# Patient Record
Sex: FEMALE | Race: WHITE | Employment: OTHER | URBAN - METROPOLITAN AREA
[De-identification: names, ages, dates, MRNs, and addresses within clinical notes are randomized per-mention and may not be internally consistent; named-entity substitution may affect disease eponyms.]

---

## 2023-03-10 DIAGNOSIS — M79.672 LEFT FOOT PAIN: Primary | ICD-10-CM

## 2023-03-14 ENCOUNTER — OFFICE VISIT (OUTPATIENT)
Dept: ORTHOPEDIC SURGERY | Age: 68
End: 2023-03-14
Payer: MEDICARE

## 2023-03-14 VITALS — RESPIRATION RATE: 16 BRPM | HEIGHT: 65 IN | OXYGEN SATURATION: 100 % | WEIGHT: 154 LBS | BODY MASS INDEX: 25.66 KG/M2

## 2023-03-14 DIAGNOSIS — M21.861 GASTROCNEMIUS EQUINUS OF RIGHT LOWER EXTREMITY: ICD-10-CM

## 2023-03-14 DIAGNOSIS — M20.21 HALLUX RIGIDUS OF RIGHT FOOT: Primary | ICD-10-CM

## 2023-03-14 DIAGNOSIS — M79.671 RIGHT FOOT PAIN: Primary | ICD-10-CM

## 2023-03-14 PROCEDURE — G8400 PT W/DXA NO RESULTS DOC: HCPCS | Performed by: ORTHOPAEDIC SURGERY

## 2023-03-14 PROCEDURE — G8484 FLU IMMUNIZE NO ADMIN: HCPCS | Performed by: ORTHOPAEDIC SURGERY

## 2023-03-14 PROCEDURE — 1123F ACP DISCUSS/DSCN MKR DOCD: CPT | Performed by: ORTHOPAEDIC SURGERY

## 2023-03-14 PROCEDURE — G8427 DOCREV CUR MEDS BY ELIG CLIN: HCPCS | Performed by: ORTHOPAEDIC SURGERY

## 2023-03-14 PROCEDURE — 1036F TOBACCO NON-USER: CPT | Performed by: ORTHOPAEDIC SURGERY

## 2023-03-14 PROCEDURE — G8417 CALC BMI ABV UP PARAM F/U: HCPCS | Performed by: ORTHOPAEDIC SURGERY

## 2023-03-14 PROCEDURE — 1090F PRES/ABSN URINE INCON ASSESS: CPT | Performed by: ORTHOPAEDIC SURGERY

## 2023-03-14 PROCEDURE — 3017F COLORECTAL CA SCREEN DOC REV: CPT | Performed by: ORTHOPAEDIC SURGERY

## 2023-03-14 PROCEDURE — 99204 OFFICE O/P NEW MOD 45 MIN: CPT | Performed by: ORTHOPAEDIC SURGERY

## 2023-03-14 NOTE — LETTER
March 14, 2023      [address] [id number]      Patient: Franc Guajardo   MR Number: [id number]   YOB: 1955   Date of Visit: 3/14/2023       Dear Maribel Roche: Thank you for referring Franc Guajardo to me for evaluation/treatment. Below are the relevant portions of my assessment and plan of care. She has right foot pain, secondary to hallux rigidus, with underlying gastroc equinus contracture. Notably, she has the past medical history as above. She has a history of DVT (denies any current anticoagulation). We had a discussion today about the likely diagnosis and its natural history, physical exam and imaging findings, as well as various treatment options in detail. Surgically, we discussed a possible right foot first MTP joint fusion with bone grafting and gastroc recession, depending on her clinical course. We discussed the expected postoperative course, including the relevant weightbearing restrictions/immobilization. Prior to her initial office visit, she has tried topical anesthetics and steroids. At today's visit, after discussing surgical nonsurgical treatment options, she wished to proceed with nonsurgical management at this time, and states that she may be interested in surgery in January 2024. She does state that she \"cannot exercise anymore\" due to her right foot pain. At today's visit, she also briefly met with my surgical scheduler, Gerson Prieto, and she will call us if/when she wishes to proceed with something surgical sooner. Orders/referrals were placed as below at today's visit. The patient was referred to prosthetics and orthotics to obtain a full-length rigid orthotic insert with Florian's extension. I provided a prescription for Voltaren (4g TOPL q QID PRN pain). We also discussed the potential right first MTP joint injection, however she has declined this option at this time.       All questions were answered and the above plan was agreed upon. The patient will return to clinic PRN without x-rays. In the future, we may consider a right first MTP joint injection versus surgery as above whenever she wishes to proceed. If you have questions, please do not hesitate to call me. I look forward to following [place] along with you.     Sincerely,        Ritika Laguerre MD

## 2023-03-14 NOTE — PROGRESS NOTES
[address] [place] [address] AND SPORTS MEDICINE  [facility]  [facility] [place] [address]  Dept: [phone]    Ambulatory Orthopedic Consult      CHIEF COMPLAINT:    Chief Complaint   Patient presents with    Foot Pain     Right       HISTORY OF PRESENT ILLNESS:      The patient is a 69 y.o. female who is being seen for evaluation of the above, which began for about 2 years atraumatically  . At today's visit, she is using no brace/assistive device. History is obtained today from:   [x]  the patient     [x]  [facility]     []  one family member/friend    []  multiple family members/friends    []  other:      The patient was previously seen a podiatrist (Jared Ornelas). The patient is referred here today from the orthopedics office in [place]. At today's visit, she localizes her pain to the right first MTP joint. REVIEW OF SYSTEMS:  Musculoskeletal: See HPI for pertinent positives     Past Medical History:    She  has no past medical history on file. Past Surgical History:    She  has no past surgical history on file. Current Medications:   No current outpatient medications on file. Allergies:    Patient has no known allergies. Family History:  family history is not on file. Social History:   Social History     Occupational History    Not on file   Tobacco Use    Smoking status: Former     Types: Cigarettes     Quit date:      Years since quittin.2    Smokeless tobacco: Never   Substance and Sexual Activity    Alcohol use: Not on file    Drug use: Not on file    Sexual activity: Not on file     Retired, previously worked in a [address] [facility]:  Resp 16   Ht 5' 5\" (1.651 m)   Wt 154 lb (69.9 kg)   SpO2 100%   BMI 25.63 kg/m²    Psych: awake, alert  Cardio:  well perfused extremities, no cyanosis  Resp:  normal respiratory effort  Musculoskeletal:    RLE:  Vascular: Limb well perfused, compartments soft/compressible. Skin: No erythema/ulcers. Intact. Neurovascular Status:  Grossly neurovascularly intact throughout   Tenderness to Palpation: First MTP joint  -Gastroc equinus--significant  --1st MTP:  Grind Test: Positive; Pain with Max Dorsiflexion: Positive      LLE:  Vascular: Limb well perfused, compartments soft/compressible. Skin: No erythema/ulcers. Intact. Neurovascular Status:  Grossly neurovascularly intact throughout   Tenderness to Palpation:       RADIOLOGY:   3/14/2023 FINDINGS:  Three weightbearing views (AP, Mortise, and Lateral) of the right ankle and three weightbearing views (AP, Oblique, Lateral) of the right foot were obtained in the office today and reviewed, revealing no acute fracture, dislocation, or radioopaque foreign body/tumor. Degenerative changes of the first MTP joint with joint space narrowing, sclerosis, and osteophytes. IMPRESSION:  No acute fracture/dislocation. Degenerative changes as above. Electronically signed by Celestina Boyd MD    Relevant previous imaging reviewed, both imaging and report(s) as below:    No results found. ASSESSMENT AND PLAN:  Body mass index is 25.63 kg/m². She has right foot pain, secondary to hallux rigidus, with underlying gastroc equinus contracture. Notably, she has the past medical history as above. She has a history of DVT (denies any current anticoagulation). We had a discussion today about the likely diagnosis and its natural history, physical exam and imaging findings, as well as various treatment options in detail. Surgically, we discussed a possible right foot first MTP joint fusion with bone grafting and gastroc recession, depending on her clinical course. We discussed the expected postoperative course, including the relevant weightbearing restrictions/immobilization. Prior to her initial office visit, she has tried topical anesthetics and steroids.   At today's visit, after discussing surgical nonsurgical treatment options, she wished to proceed with nonsurgical management at this time, and states that she may be interested in surgery in January 2024. She does state that she \"cannot exercise anymore\" due to her right foot pain. At today's visit, she also briefly met with my surgical scheduler, Lucero Retana, and she will call us if/when she wishes to proceed with something surgical sooner. Orders/referrals were placed as below at today's visit. The patient was referred to prosthetics and orthotics to obtain a full-length rigid orthotic insert with Florian's extension. I provided a prescription for Voltaren (4g TOPL q QID PRN pain). We also discussed the potential right first MTP joint injection, however she has declined this option at this time. All questions were answered and the above plan was agreed upon. The patient will return to clinic PRN without x-rays. In the future, we may consider a right first MTP joint injection versus surgery as above whenever she wishes to proceed. At the patient's next visit, depending on how the patient is doing and/or new imaging/labs results, we may consider the following options:    []  Lace up ankle     []  CAM boot         []  removable wrist brace     []  PT:        []  Wean out immobilization         []  Adv activity      []  Footmind        []  Spenco       []  Custom Orthotic:               []  AZ brace                    []  Rocker Bottom      []  Night splint    []  Heel cups        []  Strap        []  Toe gizmos    []  Topl        []  NSAIDs         []  Kacie        []  Ref:         []  Stress Xray    []  CT        []  MRI  []  Inj:          []  Consider OR      []  Pick OR date    No follow-ups on file. No orders of the defined types were placed in this encounter. No orders of the defined types were placed in this encounter.         Sarai Miller MD  Orthopedic Surgery        Please excuse any typos/errors, as this note was created with the assistance of voice recognition software. While intending to generate a document that actually reflects the content of the visit, the document can still have some errors including those of syntax and sound-a-like substitutions which may escape proof reading. In such instances, actual meaning can be extrapolated by context.